# Patient Record
Sex: MALE | Race: BLACK OR AFRICAN AMERICAN | Employment: OTHER | URBAN - METROPOLITAN AREA
[De-identification: names, ages, dates, MRNs, and addresses within clinical notes are randomized per-mention and may not be internally consistent; named-entity substitution may affect disease eponyms.]

---

## 2024-05-09 ENCOUNTER — NURSING HOME VISIT (OUTPATIENT)
Dept: POST ACUTE CARE | Facility: EXTERNAL LOCATION | Age: 84
End: 2024-05-09

## 2024-05-09 DIAGNOSIS — F02.B18 MODERATE ALZHEIMER'S DEMENTIA WITH OTHER BEHAVIORAL DISTURBANCE, UNSPECIFIED TIMING OF DEMENTIA ONSET (MULTI): ICD-10-CM

## 2024-05-09 DIAGNOSIS — Z00.00 ROUTINE GENERAL MEDICAL EXAMINATION AT A HEALTH CARE FACILITY: ICD-10-CM

## 2024-05-09 DIAGNOSIS — G30.9 MODERATE ALZHEIMER'S DEMENTIA WITH OTHER BEHAVIORAL DISTURBANCE, UNSPECIFIED TIMING OF DEMENTIA ONSET (MULTI): ICD-10-CM

## 2024-05-09 DIAGNOSIS — K59.00 CONSTIPATION, UNSPECIFIED CONSTIPATION TYPE: ICD-10-CM

## 2024-05-09 DIAGNOSIS — I15.9 SECONDARY HYPERTENSION: ICD-10-CM

## 2024-05-09 DIAGNOSIS — E56.9 VITAMIN DEFICIENCY: ICD-10-CM

## 2024-05-09 DIAGNOSIS — F63.9 IMPULSE DISORDER: ICD-10-CM

## 2024-05-09 NOTE — LETTER
Patient: Cabrera Abdullahi  : 1940    Encounter Date: 2024    Name: Cabrera Abdullahi  : 1940  MRN: [id number]  Visit Date: 2024  Chief Complaint: HISTORY AND PHYSICAL    HPI: 83 y/o male who was admitted to [facility] all male unit on 2024 from [facility] due increased sexual behaviors.     Subjective: Seen and examined today. Denies N/V/D/C/CP. No fever or chills. Reviewed hospitalization and recent nursing home stay with pt. Questions answered with understanding verbalized. Pt reports that he grew up in [place] and has 5 adult kids. Nursing team report no issues. He is redirectable when making sexual comments to staff and residents.    The patient was counseled regarding diagnostic results, instructions for management, risk factor reductions, prognosis, patient and family education, impressions, risks and benefits of treatment options and importance of compliance with treatment. I have reviewed nursing notes since my last visit and document any significant changes Reviewed orders, medications, Labs. Reviewed chart looking at current medications, treatments, labs, x-rays etc.     ROS:  As above in subjective. Otherwise, all other systems have been reviewed and are negative for complaint.    Current Outpatient Medications   Medication Instructions   • acetaminophen (TYLENOL) 650 mg, oral, Every 4 hours PRN   • amLODIPine (NORVASC) 10 mg, oral, Daily   • bisacodyl (DULCOLAX) 10 mg, rectal, Once as needed   • cholecalciferol, vitamin D3, (VITAMIN D3 ORAL) 1 capsule, oral, Once Weekly, Every Friday (1.25mg = 44174 units)   • cimetidine (TAGAMET) 200 mg, oral, 2 times daily   • cyanocobalamin (VITAMIN B-12) 1,000 mcg, oral, Daily   • dextromethorphan-guaifenesin (Robitussin DM)  mg/5 mL oral liquid 10 mL, oral, 3 times daily PRN   • divalproex sprinkle (DEPAKOTE SPRINKLE) 125 mg, oral, 2 times daily   • lactulose 30 g, oral, Daily   • magnesium hydroxide (Milk of Magnesia)  400 mg/5 mL suspension 30 mL, oral, Daily PRN   • sodium chloride (Ocean) 0.65 % nasal spray 1 spray, Each Nostril, As needed      Past Medical History:   Diagnosis Date   • Alzheimer disease (Multi)    • Anemia    • Anxiety    • Constipation    • Contracture of hand joint, left    • Dementia (Multi)    • Depression    • GERD (gastroesophageal reflux disease)    • Hypercholesterolemia    • Hypertension    • Impulse disorder    • Insomnia    • Violent behavior    • Vitamin deficiency      History reviewed. No pertinent surgical history.    Family History   Family history unknown: Yes     Social History     Tobacco Use   • Smoking status: Never   • Smokeless tobacco: Never   Substance Use Topics   • Alcohol use: Not Currently     Allergies   Allergen Reactions   • Codeine Unknown   • Morphine Unknown      Visit Vitals  /78   Pulse 74   Temp 36.7 °C (98 °F)   Resp 18   Wt 69.2 kg (152 lb 9.6 oz)   SpO2 95%   Smoking Status Never      Physical Exam  Vitals reviewed. Exam conducted with a chaperone present.   Constitutional:       Appearance: Normal appearance. He is well-developed.   HENT:      Head: Normocephalic.      Right Ear: External ear normal.      Left Ear: External ear normal.      Nose: Nose normal.      Mouth/Throat:      Lips: Pink.      Mouth: Mucous membranes are moist.   Eyes:      General: Lids are normal.      Pupils: Pupils are equal, round, and reactive to light.   Neck:      Trachea: Trachea normal.   Cardiovascular:      Rate and Rhythm: Normal rate and regular rhythm.      Heart sounds: Normal heart sounds.   Pulmonary:      Effort: Pulmonary effort is normal.      Breath sounds: Normal breath sounds.   Abdominal:      General: Bowel sounds are normal.      Palpations: Abdomen is soft.   Musculoskeletal:      Cervical back: Full passive range of motion without pain.      Comments: Left hand contracture   Skin:     General: Skin is warm and moist.      Findings: Bruising present.    Neurological:      General: No focal deficit present.      Mental Status: He is alert. Mental status is at baseline. He is confused.      Motor: Weakness present.   Psychiatric:         Attention and Perception: He is inattentive.         Mood and Affect: Mood normal. Affect is flat.         Speech: Speech normal.         Behavior: Behavior is cooperative.         Thought Content: Thought content normal.         Cognition and Memory: Cognition is impaired. Memory is impaired.         Judgment: Judgment normal.         Results/Data:   Results were reviewed and addressed accordingly. Lab Results were also reviewed in edlab.     Provider Impression:   Alzheimer's Dementia with Sexually Inappropriate Behaviors  - monitor on secure all male unit  - c/w depakote, cimetidine  - sundowning precautions    Hypertension  - c/w amlodipine  - monitor vital signs    Constipation Prevention  - c/w stool softeners and laxatives PRN    Vitamin Deficiency  - c/w supplementation    Code Status  - Full Code    ----------------  Written by Winifred Cunningham RN, acting as a scribe for Dr. Villeda. This note accurately reflects the work and decisions made by Dr. Villeda.     I, Dr. Villeda, attest all medical record entries made by the scribe were under my direction and were personally dictated by me. I have reviewed the chart and agree that the record accurately reflects my performance of the history, physical exam, and assessment and plan.     Electronically Signed By: Israel Pena MD   9/26/24 10:24 AM

## 2024-07-05 ENCOUNTER — NURSING HOME VISIT (OUTPATIENT)
Dept: POST ACUTE CARE | Facility: EXTERNAL LOCATION | Age: 84
End: 2024-07-05

## 2024-07-05 DIAGNOSIS — I15.9 SECONDARY HYPERTENSION: ICD-10-CM

## 2024-07-05 DIAGNOSIS — F02.B18 MODERATE ALZHEIMER'S DEMENTIA WITH OTHER BEHAVIORAL DISTURBANCE, UNSPECIFIED TIMING OF DEMENTIA ONSET (MULTI): ICD-10-CM

## 2024-07-05 DIAGNOSIS — Z00.00 ROUTINE GENERAL MEDICAL EXAMINATION AT A HEALTH CARE FACILITY: ICD-10-CM

## 2024-07-05 DIAGNOSIS — G30.9 MODERATE ALZHEIMER'S DEMENTIA WITH OTHER BEHAVIORAL DISTURBANCE, UNSPECIFIED TIMING OF DEMENTIA ONSET (MULTI): ICD-10-CM

## 2024-07-05 DIAGNOSIS — E56.9 VITAMIN DEFICIENCY: ICD-10-CM

## 2024-07-05 DIAGNOSIS — F63.9 IMPULSE DISORDER: ICD-10-CM

## 2024-07-05 DIAGNOSIS — K59.00 CONSTIPATION, UNSPECIFIED CONSTIPATION TYPE: ICD-10-CM

## 2024-07-05 NOTE — LETTER
Patient: Cabrera Abdullahi  : 1940    Encounter Date: 2024    Name: Cabrera Abdullahi  : 1940  MRN: [id number]  Visit Date: 2024  Chief Complaint: Monthly LTC Physician Visit for Chronic Disease Management    HPI: 85 y/o male [facility] due increased sexual behaviors.     Subjective: Seen and examined today. Denies N/V/D/C/CP. No fever or chills. Nursing team report no issues. He is redirectable when making sexual comments to staff and residents. Dietician reports ~9# weight loss over the last 30 days. Advised to increase supplements.    The patient was counseled regarding diagnostic results, instructions for management, risk factor reductions, prognosis, patient and family education, impressions, risks and benefits of treatment options and importance of compliance with treatment. I have reviewed nursing notes since my last visit and document any significant changes Reviewed orders, medications, Labs. Reviewed chart looking at current medications, treatments, labs, x-rays etc.     ROS:  As above in subjective. Otherwise, all other systems have been reviewed and are negative for complaint.    Current Outpatient Medications   Medication Instructions   • acetaminophen (TYLENOL) 650 mg, oral, Every 4 hours PRN   • amLODIPine (NORVASC) 10 mg, oral, Daily   • bisacodyl (DULCOLAX) 10 mg, rectal, Once as needed   • cholecalciferol, vitamin D3, (VITAMIN D3 ORAL) 1 capsule, oral, Once Weekly, Every Friday (1.25mg = 62558 units)   • cimetidine (TAGAMET) 200 mg, oral, 2 times daily   • cyanocobalamin (VITAMIN B-12) 1,000 mcg, oral, Daily   • dextromethorphan-guaifenesin (Robitussin DM)  mg/5 mL oral liquid 10 mL, oral, 3 times daily PRN   • divalproex sprinkle (DEPAKOTE SPRINKLE) 125 mg, oral, 2 times daily   • lactulose 30 g, oral, Daily   • magnesium hydroxide (Milk of Magnesia) 400 mg/5 mL suspension 30 mL, oral, Daily PRN    • sodium chloride (Ocean) 0.65 % nasal spray 1 spray, Each Nostril, As needed      Physical Exam  Vitals reviewed. Exam conducted with a chaperone present.   Constitutional:       Appearance: Normal appearance. He is well-developed.   HENT:      Head: Normocephalic.      Right Ear: External ear normal.      Left Ear: External ear normal.      Nose: Nose normal.      Mouth/Throat:      Lips: Pink.      Mouth: Mucous membranes are moist.   Eyes:      General: Lids are normal.      Pupils: Pupils are equal, round, and reactive to light.   Neck:      Trachea: Trachea normal.   Cardiovascular:      Rate and Rhythm: Normal rate and regular rhythm.      Heart sounds: Normal heart sounds.   Pulmonary:      Effort: Pulmonary effort is normal.      Breath sounds: Normal breath sounds.   Abdominal:      General: Bowel sounds are normal.      Palpations: Abdomen is soft.   Musculoskeletal:      Cervical back: Full passive range of motion without pain.      Comments: Left hand contracture   Skin:     General: Skin is warm and moist.      Findings: Bruising present.   Neurological:      General: No focal deficit present.      Mental Status: He is alert. Mental status is at baseline. He is confused.      Motor: Weakness present.   Psychiatric:         Attention and Perception: He is inattentive.         Mood and Affect: Mood normal. Affect is flat.         Speech: Speech normal.         Behavior: Behavior is cooperative.         Thought Content: Thought content normal.         Cognition and Memory: Cognition is impaired. Memory is impaired.         Judgment: Judgment normal.       Results/Data:   Results were reviewed and addressed accordingly. Lab Results were also reviewed in eMedlab.     Provider Impression:   Alzheimer's Dementia with Sexually Inappropriate Behaviors  #Impulse Disorder  - monitor on secure all male unit  - c/w depakote, cimetidine  - sundowning precautions    Hypertension  - c/w amlodipine  - monitor vital  signs    Constipation Prevention  - c/w stool softeners and laxatives PRN    Vitamin Deficiency  - c/w supplementation    Weight Loss  - Dietician reports ~9# weight loss over the last 30 days.   - Advised to increase supplements.  - monitor PO intake    Code Status  - Full Code    ----------------  Written by Winifred Cunningham RN, acting as a scribe for Dr. Villeda. This note accurately reflects the work and decisions made by Dr. Villeda.     I, Dr. Villeda, attest all medical record entries made by the scribe were under my direction and were personally dictated by me. I have reviewed the chart and agree that the record accurately reflects my performance of the history, physical exam, and assessment and plan.       Electronically Signed By: Israel Pena MD   9/26/24 10:24 AM

## 2024-09-08 ENCOUNTER — NURSING HOME VISIT (OUTPATIENT)
Dept: POST ACUTE CARE | Facility: EXTERNAL LOCATION | Age: 84
End: 2024-09-08

## 2024-09-08 DIAGNOSIS — F63.9 IMPULSE DISORDER: ICD-10-CM

## 2024-09-08 DIAGNOSIS — G30.9 MODERATE ALZHEIMER'S DEMENTIA WITH OTHER BEHAVIORAL DISTURBANCE, UNSPECIFIED TIMING OF DEMENTIA ONSET (MULTI): ICD-10-CM

## 2024-09-08 DIAGNOSIS — E56.9 VITAMIN DEFICIENCY: ICD-10-CM

## 2024-09-08 DIAGNOSIS — K59.00 CONSTIPATION, UNSPECIFIED CONSTIPATION TYPE: ICD-10-CM

## 2024-09-08 DIAGNOSIS — F02.B18 MODERATE ALZHEIMER'S DEMENTIA WITH OTHER BEHAVIORAL DISTURBANCE, UNSPECIFIED TIMING OF DEMENTIA ONSET (MULTI): ICD-10-CM

## 2024-09-08 DIAGNOSIS — Z00.00 ROUTINE GENERAL MEDICAL EXAMINATION AT A HEALTH CARE FACILITY: ICD-10-CM

## 2024-09-08 DIAGNOSIS — I15.9 SECONDARY HYPERTENSION: ICD-10-CM

## 2024-09-08 NOTE — LETTER
Patient: Cabrera Abdullahi  : 1940    Encounter Date: 2024    Name: Cabrera Abdullahi  : 1940  MRN: [id number]  Visit Date: 2024  Chief Complaint: Monthly LTC Physician Visit for Chronic Disease Management    HPI: 85 y/o male who was admitted to [facility] all male unit on 2024 from [facility] due increased sexual behaviors.     Subjective: Seen and examined today. Denies N/V/D/C/CP. No fever or chills. Nursing team report no issues. He is redirectable when making sexual comments to staff and residents.     The patient was counseled regarding diagnostic results, instructions for management, risk factor reductions, prognosis, patient and family education, impressions, risks and benefits of treatment options and importance of compliance with treatment. I have reviewed nursing notes since my last visit and document any significant changes Reviewed orders, medications, Labs. Reviewed chart looking at current medications, treatments, labs, x-rays etc.     ROS:  As above in subjective. Otherwise, all other systems have been reviewed and are negative for complaint.    Current Outpatient Medications   Medication Instructions   • acetaminophen (TYLENOL) 650 mg, oral, Every 4 hours PRN   • amLODIPine (NORVASC) 10 mg, oral, Daily   • bisacodyl (DULCOLAX) 10 mg, rectal, Once as needed   • cholecalciferol, vitamin D3, (VITAMIN D3 ORAL) 1 capsule, oral, Once Weekly, Every Friday (1.25mg = 94533 units)   • cimetidine (TAGAMET) 200 mg, oral, 2 times daily   • cyanocobalamin (VITAMIN B-12) 1,000 mcg, oral, Daily   • dextromethorphan-guaifenesin (Robitussin DM)  mg/5 mL oral liquid 10 mL, oral, 3 times daily PRN   • divalproex sprinkle (DEPAKOTE SPRINKLE) 125 mg, oral, 2 times daily   • lactulose 30 g, oral, Daily   • magnesium hydroxide (Milk of Magnesia) 400 mg/5 mL suspension 30 mL, oral, Daily PRN   • sodium chloride (Ocean) 0.65 % nasal spray 1 spray, Each Nostril, As needed       Physical Exam  Vitals reviewed. Exam conducted with a chaperone present.   Constitutional:       Appearance: Normal appearance. He is well-developed.   HENT:      Head: Normocephalic.      Right Ear: External ear normal.      Left Ear: External ear normal.      Nose: Nose normal.      Mouth/Throat:      Lips: Pink.      Mouth: Mucous membranes are moist.   Eyes:      General: Lids are normal.      Pupils: Pupils are equal, round, and reactive to light.   Neck:      Trachea: Trachea normal.   Cardiovascular:      Rate and Rhythm: Normal rate and regular rhythm.      Heart sounds: Normal heart sounds.   Pulmonary:      Effort: Pulmonary effort is normal.      Breath sounds: Normal breath sounds.   Abdominal:      General: Bowel sounds are normal.      Palpations: Abdomen is soft.   Musculoskeletal:      Cervical back: Full passive range of motion without pain.      Comments: Left hand contracture   Skin:     General: Skin is warm and moist.      Findings: Bruising present.   Neurological:      General: No focal deficit present.      Mental Status: He is alert. Mental status is at baseline. He is confused.      Motor: Weakness present.   Psychiatric:         Attention and Perception: He is inattentive.         Mood and Affect: Mood normal. Affect is flat.         Speech: Speech normal.         Behavior: Behavior is cooperative.         Thought Content: Thought content normal.         Cognition and Memory: Cognition is impaired. Memory is impaired.         Judgment: Judgment normal.       Results/Data:   Results were reviewed and addressed accordingly. Lab Results were also reviewed in eMedlab.     Provider Impression:   Alzheimer's Dementia with Sexually Inappropriate Behaviors  #Impulse Disorder  - monitor on secure all male unit  - c/w depakote, cimetidine  - sundowning precautions    Hypertension  - c/w amlodipine  - monitor vital signs    Constipation Prevention  - c/w stool softeners and laxatives PRN    Vitamin  Deficiency  - c/w supplementation    Weight Loss  - Dietician reports ~9# weight loss over the last 30 days.   - Advised to increase supplements.  - monitor PO intake    Code Status  - Full Code    ----------------  Written by Winifred Cunningham RN, acting as a scribe for Dr. Villeda. This note accurately reflects the work and decisions made by Dr. Villeda.     I, Dr. Villeda, attest all medical record entries made by the scribe were under my direction and were personally dictated by me. I have reviewed the chart and agree that the record accurately reflects my performance of the history, physical exam, and assessment and plan.     Patient was identified as a fall risk. Risk prevention instructions provided.      Electronically Signed By: Israel Pena MD   9/27/24 12:45 PM

## 2024-09-26 VITALS
DIASTOLIC BLOOD PRESSURE: 78 MMHG | OXYGEN SATURATION: 95 % | SYSTOLIC BLOOD PRESSURE: 130 MMHG | RESPIRATION RATE: 18 BRPM | WEIGHT: 152.6 LBS | HEART RATE: 74 BPM | TEMPERATURE: 98 F

## 2024-09-26 PROBLEM — Z00.00 ROUTINE GENERAL MEDICAL EXAMINATION AT A HEALTH CARE FACILITY: Status: ACTIVE | Noted: 2024-09-26

## 2024-09-26 RX ORDER — ZINC GLUCONATE 13.3 MG
200 LOZENGE ORAL 2 TIMES DAILY
COMMUNITY

## 2024-09-26 RX ORDER — BISACODYL 10 MG/1
10 SUPPOSITORY RECTAL ONCE AS NEEDED
COMMUNITY

## 2024-09-26 RX ORDER — ACETAMINOPHEN 325 MG/1
650 TABLET ORAL EVERY 4 HOURS PRN
COMMUNITY

## 2024-09-26 RX ORDER — DIVALPROEX SODIUM 125 MG/1
125 CAPSULE, COATED PELLETS ORAL 2 TIMES DAILY
COMMUNITY

## 2024-09-26 RX ORDER — AMLODIPINE BESYLATE 10 MG/1
10 TABLET ORAL DAILY
COMMUNITY

## 2024-09-26 RX ORDER — GUAIFENESIN/DEXTROMETHORPHAN 100-10MG/5
10 SYRUP ORAL 3 TIMES DAILY PRN
COMMUNITY

## 2024-09-26 RX ORDER — ADHESIVE BANDAGE
30 BANDAGE TOPICAL DAILY PRN
COMMUNITY

## 2024-09-26 RX ORDER — LACTULOSE 10 G/15ML
30 SOLUTION ORAL DAILY
COMMUNITY

## 2024-09-26 RX ORDER — LANOLIN ALCOHOL/MO/W.PET/CERES
1000 CREAM (GRAM) TOPICAL DAILY
COMMUNITY

## 2024-09-26 ASSESSMENT — ENCOUNTER SYMPTOMS
OCCASIONAL FEELINGS OF UNSTEADINESS: 1
LOSS OF SENSATION IN FEET: 0
DEPRESSION: 1

## 2024-09-26 NOTE — PROGRESS NOTES
Name: Cabrera Abdullahi  : 1940  MRN: [id number]  Visit Date: 2024  Chief Complaint: Monthly LTC Physician Visit for Chronic Disease Management    HPI: 85 y/o male who was admitted to [facility] at the [facility] all male unit on 2024 from [facility] due increased sexual behaviors.     Subjective: Seen and examined today. Denies N/V/D/C/CP. No fever or chills. Nursing team report no issues. He is redirectable when making sexual comments to staff and residents. Dietician reports ~9# weight loss over the last 30 days. Advised to increase supplements.    The patient was counseled regarding diagnostic results, instructions for management, risk factor reductions, prognosis, patient and family education, impressions, risks and benefits of treatment options and importance of compliance with treatment. I have reviewed nursing notes since my last visit and document any significant changes Reviewed orders, medications, Labs. Reviewed chart looking at current medications, treatments, labs, x-rays etc.     ROS:  As above in subjective. Otherwise, all other systems have been reviewed and are negative for complaint.    Current Outpatient Medications   Medication Instructions    acetaminophen (TYLENOL) 650 mg, oral, Every 4 hours PRN    amLODIPine (NORVASC) 10 mg, oral, Daily    bisacodyl (DULCOLAX) 10 mg, rectal, Once as needed    cholecalciferol, vitamin D3, (VITAMIN D3 ORAL) 1 capsule, oral, Once Weekly, Every Friday (1.25mg = 86899 units)    cimetidine (TAGAMET) 200 mg, oral, 2 times daily    cyanocobalamin (VITAMIN B-12) 1,000 mcg, oral, Daily    dextromethorphan-guaifenesin (Robitussin DM)  mg/5 mL oral liquid 10 mL, oral, 3 times daily PRN    divalproex sprinkle (DEPAKOTE SPRINKLE) 125 mg, oral, 2 times daily    lactulose 30 g, oral, Daily    magnesium hydroxide (Milk of Magnesia) 400 mg/5 mL suspension 30 mL, oral, Daily PRN    sodium chloride (Ocean) 0.65 % nasal spray 1 spray, Each Nostril, As needed       Physical Exam  Vitals reviewed. Exam conducted with a chaperone present.   Constitutional:       Appearance: Normal appearance. He is well-developed.   HENT:      Head: Normocephalic.      Right Ear: External ear normal.      Left Ear: External ear normal.      Nose: Nose normal.      Mouth/Throat:      Lips: Pink.      Mouth: Mucous membranes are moist.   Eyes:      General: Lids are normal.      Pupils: Pupils are equal, round, and reactive to light.   Neck:      Trachea: Trachea normal.   Cardiovascular:      Rate and Rhythm: Normal rate and regular rhythm.      Heart sounds: Normal heart sounds.   Pulmonary:      Effort: Pulmonary effort is normal.      Breath sounds: Normal breath sounds.   Abdominal:      General: Bowel sounds are normal.      Palpations: Abdomen is soft.   Musculoskeletal:      Cervical back: Full passive range of motion without pain.      Comments: Left hand contracture   Skin:     General: Skin is warm and moist.      Findings: Bruising present.   Neurological:      General: No focal deficit present.      Mental Status: He is alert. Mental status is at baseline. He is confused.      Motor: Weakness present.   Psychiatric:         Attention and Perception: He is inattentive.         Mood and Affect: Mood normal. Affect is flat.         Speech: Speech normal.         Behavior: Behavior is cooperative.         Thought Content: Thought content normal.         Cognition and Memory: Cognition is impaired. Memory is impaired.         Judgment: Judgment normal.       Results/Data:   Results were reviewed and addressed accordingly. Lab Results were also reviewed in eMedlab.     Provider Impression:   Alzheimer's Dementia with Sexually Inappropriate Behaviors  #Impulse Disorder  - monitor on secure all male unit  - c/w depakote, cimetidine  - sundowning precautions    Hypertension  - c/w amlodipine  - monitor vital signs    Constipation Prevention  - c/w stool softeners and laxatives PRN    Vitamin  Deficiency  - c/w supplementation    Weight Loss  - Dietician reports ~9# weight loss over the last 30 days.   - Advised to increase supplements.  - monitor PO intake    Code Status  - Full Code    ----------------  Written by Winifred Cunningham RN, acting as a scribe for Dr. Villeda. This note accurately reflects the work and decisions made by Dr. Villeda.     I, Dr. Villeda, attest all medical record entries made by the scribe were under my direction and were personally dictated by me. I have reviewed the chart and agree that the record accurately reflects my performance of the history, physical exam, and assessment and plan.

## 2024-09-26 NOTE — PROGRESS NOTES
Name: Cabrera Abdullahi  : 1940  MRN: [id number]  Visit Date: 2024  Chief Complaint: Monthly LT Physician Visit for Chronic Disease Management    HPI: 83 y/o male who was admitted to [facility] all male unit on 2024 from [facility] due increased sexual behaviors.     Subjective: Seen and examined today. Denies N/V/D/C/CP. No fever or chills. Nursing team report no issues. He is redirectable when making sexual comments to staff and residents.     The patient was counseled regarding diagnostic results, instructions for management, risk factor reductions, prognosis, patient and family education, impressions, risks and benefits of treatment options and importance of compliance with treatment. I have reviewed nursing notes since my last visit and document any significant changes Reviewed orders, medications, Labs. Reviewed chart looking at current medications, treatments, labs, x-rays etc.     ROS:  As above in subjective. Otherwise, all other systems have been reviewed and are negative for complaint.    Current Outpatient Medications   Medication Instructions    acetaminophen (TYLENOL) 650 mg, oral, Every 4 hours PRN    amLODIPine (NORVASC) 10 mg, oral, Daily    bisacodyl (DULCOLAX) 10 mg, rectal, Once as needed    cholecalciferol, vitamin D3, (VITAMIN D3 ORAL) 1 capsule, oral, Once Weekly, Every Friday (1.25mg = 71553 units)    cimetidine (TAGAMET) 200 mg, oral, 2 times daily    cyanocobalamin (VITAMIN B-12) 1,000 mcg, oral, Daily    dextromethorphan-guaifenesin (Robitussin DM)  mg/5 mL oral liquid 10 mL, oral, 3 times daily PRN    divalproex sprinkle (DEPAKOTE SPRINKLE) 125 mg, oral, 2 times daily    lactulose 30 g, oral, Daily    magnesium hydroxide (Milk of Magnesia) 400 mg/5 mL suspension 30 mL, oral, Daily PRN    sodium chloride (Ocean) 0.65 % nasal spray 1 spray, Each Nostril, As needed      Physical Exam  Vitals reviewed. Exam conducted with a chaperone present.    Constitutional:       Appearance: Normal appearance. He is well-developed.   HENT:      Head: Normocephalic.      Right Ear: External ear normal.      Left Ear: External ear normal.      Nose: Nose normal.      Mouth/Throat:      Lips: Pink.      Mouth: Mucous membranes are moist.   Eyes:      General: Lids are normal.      Pupils: Pupils are equal, round, and reactive to light.   Neck:      Trachea: Trachea normal.   Cardiovascular:      Rate and Rhythm: Normal rate and regular rhythm.      Heart sounds: Normal heart sounds.   Pulmonary:      Effort: Pulmonary effort is normal.      Breath sounds: Normal breath sounds.   Abdominal:      General: Bowel sounds are normal.      Palpations: Abdomen is soft.   Musculoskeletal:      Cervical back: Full passive range of motion without pain.      Comments: Left hand contracture   Skin:     General: Skin is warm and moist.      Findings: Bruising present.   Neurological:      General: No focal deficit present.      Mental Status: He is alert. Mental status is at baseline. He is confused.      Motor: Weakness present.   Psychiatric:         Attention and Perception: He is inattentive.         Mood and Affect: Mood normal. Affect is flat.         Speech: Speech normal.         Behavior: Behavior is cooperative.         Thought Content: Thought content normal.         Cognition and Memory: Cognition is impaired. Memory is impaired.         Judgment: Judgment normal.       Results/Data:   Results were reviewed and addressed accordingly. Lab Results were also reviewed in eMed[facility].     Provider Impression:   Alzheimer's Dementia with Sexually Inappropriate Behaviors  #Impulse Disorder  - monitor on secure all male unit  - c/w depakote, cimetidine  - sundowning precautions    Hypertension  - c/w amlodipine  - monitor vital signs    Constipation Prevention  - c/w stool softeners and laxatives PRN    Vitamin Deficiency  - c/w supplementation    Weight Loss  - Dietician reports ~9#  weight loss over the last 30 days.   - Advised to increase supplements.  - monitor PO intake    Code Status  - Full Code    ----------------  Written by Winifred Cunningham RN, acting as a scribe for Dr. Villeda. This note accurately reflects the work and decisions made by Dr. Villeda.     I, Dr. Villeda, attest all medical record entries made by the scribe were under my direction and were personally dictated by me. I have reviewed the chart and agree that the record accurately reflects my performance of the history, physical exam, and assessment and plan.     Patient was identified as a fall risk. Risk prevention instructions provided.

## 2024-09-26 NOTE — PROGRESS NOTES
Name: Cabrera Abdullahi  : 1940  MRN: [id number]  Visit Date: 2024  Chief Complaint: HISTORY AND PHYSICAL    HPI: 83 y/o male who was admitted to [facility] all male unit on 2024 from [facility] due increased sexual behaviors.     Subjective: Seen and examined today. Denies N/V/D/C/CP. No fever or chills. Reviewed hospitalization and recent nursing home stay with pt. Questions answered with understanding verbalized. Pt reports that he grew up in [place] and has 5 adult kids. Nursing team report no issues. He is redirectable when making sexual comments to staff and residents.    The patient was counseled regarding diagnostic results, instructions for management, risk factor reductions, prognosis, patient and family education, impressions, risks and benefits of treatment options and importance of compliance with treatment. I have reviewed nursing notes since my last visit and document any significant changes Reviewed orders, medications, Labs. Reviewed chart looking at current medications, treatments, labs, x-rays etc.     ROS:  As above in subjective. Otherwise, all other systems have been reviewed and are negative for complaint.    Current Outpatient Medications   Medication Instructions    acetaminophen (TYLENOL) 650 mg, oral, Every 4 hours PRN    amLODIPine (NORVASC) 10 mg, oral, Daily    bisacodyl (DULCOLAX) 10 mg, rectal, Once as needed    cholecalciferol, vitamin D3, (VITAMIN D3 ORAL) 1 capsule, oral, Once Weekly, Every Friday (1.25mg = 99765 units)    cimetidine (TAGAMET) 200 mg, oral, 2 times daily    cyanocobalamin (VITAMIN B-12) 1,000 mcg, oral, Daily    dextromethorphan-guaifenesin (Robitussin DM)  mg/5 mL oral liquid 10 mL, oral, 3 times daily PRN    divalproex sprinkle (DEPAKOTE SPRINKLE) 125 mg, oral, 2 times daily    lactulose 30 g, oral, Daily    magnesium hydroxide (Milk of Magnesia) 400 mg/5 mL suspension 30 mL, oral, Daily PRN    sodium chloride (Ocean) 0.65 %  nasal spray 1 spray, Each Nostril, As needed      Past Medical History:   Diagnosis Date    Alzheimer disease (Multi)     Anemia     Anxiety     Constipation     Contracture of hand joint, left     Dementia (Multi)     Depression     GERD (gastroesophageal reflux disease)     Hypercholesterolemia     Hypertension     Impulse disorder     Insomnia     Violent behavior     Vitamin deficiency      History reviewed. No pertinent surgical history.    Family History   Family history unknown: Yes     Social History     Tobacco Use    Smoking status: Never    Smokeless tobacco: Never   Substance Use Topics    Alcohol use: Not Currently     Allergies   Allergen Reactions    Codeine Unknown    Morphine Unknown      Visit Vitals  /78   Pulse 74   Temp 36.7 °C (98 °F)   Resp 18   Wt 69.2 kg (152 lb 9.6 oz)   SpO2 95%   Smoking Status Never      Physical Exam  Vitals reviewed. Exam conducted with a chaperone present.   Constitutional:       Appearance: Normal appearance. He is well-developed.   HENT:      Head: Normocephalic.      Right Ear: External ear normal.      Left Ear: External ear normal.      Nose: Nose normal.      Mouth/Throat:      Lips: Pink.      Mouth: Mucous membranes are moist.   Eyes:      General: Lids are normal.      Pupils: Pupils are equal, round, and reactive to light.   Neck:      Trachea: Trachea normal.   Cardiovascular:      Rate and Rhythm: Normal rate and regular rhythm.      Heart sounds: Normal heart sounds.   Pulmonary:      Effort: Pulmonary effort is normal.      Breath sounds: Normal breath sounds.   Abdominal:      General: Bowel sounds are normal.      Palpations: Abdomen is soft.   Musculoskeletal:      Cervical back: Full passive range of motion without pain.      Comments: Left hand contracture   Skin:     General: Skin is warm and moist.      Findings: Bruising present.   Neurological:      General: No focal deficit present.      Mental Status: He is alert. Mental status is at  baseline. He is confused.      Motor: Weakness present.   Psychiatric:         Attention and Perception: He is inattentive.         Mood and Affect: Mood normal. Affect is flat.         Speech: Speech normal.         Behavior: Behavior is cooperative.         Thought Content: Thought content normal.         Cognition and Memory: Cognition is impaired. Memory is impaired.         Judgment: Judgment normal.         Results/Data:   Results were reviewed and addressed accordingly. Lab Results were also reviewed in [facility].     Provider Impression:   Alzheimer's Dementia with Sexually Inappropriate Behaviors  - monitor on secure all male unit  - c/w depakote, cimetidine  - sundowning precautions    Hypertension  - c/w amlodipine  - monitor vital signs    Constipation Prevention  - c/w stool softeners and laxatives PRN    Vitamin Deficiency  - c/w supplementation    Code Status  - Full Code    ----------------  Written by Winifred Cunningham RN, acting as a scribe for Dr. Villeda. This note accurately reflects the work and decisions made by Dr. Villeda.     I, Dr. Villeda, attest all medical record entries made by the scribe were under my direction and were personally dictated by me. I have reviewed the chart and agree that the record accurately reflects my performance of the history, physical exam, and assessment and plan.